# Patient Record
(demographics unavailable — no encounter records)

---

## 2025-01-21 NOTE — REVIEW OF SYSTEMS
[Fatigue] : fatigue [Recent Weight Gain (___ Lbs)] : recent weight gain: [unfilled] lbs [Headaches] : headaches [Decreased Appetite] : appetite not decreased [Visual Field Defect] : no visual field defect [Blurred Vision] : no blurred vision [Dysphagia] : no dysphagia [Neck Pain] : no neck pain [Dysphonia] : no dysphonia [Chest Pain] : no chest pain [Constipation] : no constipation [Diarrhea] : no diarrhea [Polyuria] : no polyuria [Dysuria] : no dysuria [Tremors] : no tremors [Depression] : no depression [Anxiety] : no anxiety [Polydipsia] : no polydipsia [FreeTextEntry3] : no changes

## 2025-01-21 NOTE — PHYSICAL EXAM
[Alert] : alert [Obese] : obese [No Acute Distress] : no acute distress [Normal Sclera/Conjunctiva] : normal sclera/conjunctiva [No Proptosis] : no proptosis [Thyroid Not Enlarged] : the thyroid was not enlarged [No LAD] : no lymphadenopathy [No Thyroid Nodules] : no palpable thyroid nodules [No Respiratory Distress] : no respiratory distress [No Accessory Muscle Use] : no accessory muscle use [Normal Rate and Effort] : normal respiratory rate and effort [Clear to Auscultation] : lungs were clear to auscultation bilaterally [Normal S1, S2] : normal S1 and S2 [Normal Rate] : heart rate was normal [Regular Rhythm] : with a regular rhythm [Normal Bowel Sounds] : normal bowel sounds [Not Tender] : non-tender [Soft] : abdomen soft [No Rash] : no rash [Acanthosis Nigricans] : no acanthosis nigricans [No Tremors] : no tremors [Oriented x3] : oriented to person, place, and time [Normal Affect] : the affect was normal [Normal Insight/Judgement] : insight and judgment were intact [Normal Mood] : the mood was normal [de-identified] : [facility]

## 2025-01-21 NOTE — HISTORY OF PRESENT ILLNESS
[FreeTextEntry1] : Quality: Type 1 DM Severity: moderate Duration: 20 years Onset: at the age of 2, polyuria, polydipsia, seizure Modifying Factors: Better with insulin Associated Symptoms:  here for T1DM on tslim/G6, PCOS (had elevated testosterone, polycystic ovaries), hypothyroidism  interval history on gluten free/dairy free diet 90% just diagnosed with pcos by gyn- has hirsutism and multiple follicles on ovaries. but always with normal menses report Cushing's symptoms and wants to be tested   house in [facility] but has been traveling back to [place] to visit her mom has IUD. not trying to have any more kids  1st pregnancy girl born at 24weeks,  death after 88days in NICU ([place], autopsy suggestive of Zellweger's syndrome) 2nd pregnancy ernesto (at 36.6 weeks), 1.4yo had hemivertebra, no surgery yet 3rd pregnancy parker, born 38 weeks (21)  having site issues. reports bolusing 10-15minutes before meals  Current Regimen: Tslim control IQ with dexcom G6 using Fiasp tirosint 213mcg daily- takes appropriately, feels better with it  settings: basal total 48 12a 2.0 5a 2.5 10a 2.8 2p 3 7:30p 2  ICR: 12a 5  ISF: 12a 10 9pm 35  target: 12a 110 9p 120 AIT: 2hrs   FS in office: 226 - just ate Ketones 0.2 reviewed CGM/pump data avg 212 highs: 65% in target: 35% lows: 0%  Diet: unchanged. 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
No abnormalities noted

## 2025-02-20 NOTE — REASON FOR VISIT
[Home] : at home, [unfilled] , at the time of the visit. [Medical Office: ([facility])___] : at the medical office located in  [Telehealth (audio & video)] : This visit was provided via telehealth using real-time 2-way audio visual technology. [Verbal consent obtained from patient] : the patient, [unfilled] [Follow - Up] : a follow-up visit [DM Type 1] : DM Type 1

## 2025-02-20 NOTE — ASSESSMENT
[FreeTextEntry1] : 27F w/ T1DM on tslim control IQ with G6, gluten sensitivity, PCOS, hypothyroidism  PCOS- recently diagnosed. has IUD. does not want kids at this point. not on spironolactone but was discussed with gyn. warned that cannot get pregnant if taking spironolactone. might do better with metformin. try metformin ER 500mg daily with food (can probably take more if feels better on it). on pcos pills from gyn  Obesity- doing better, doing gluten free/dairy diet. 12/2022 1mg dst normal, repeat was normal   Type 1 DM on pump and sensor, uncontrolled, change sensor to G7- still needs to try to rotate more like the upper buttock bolus before meals when possible decrease carb ratio at 10am-2pm 5 to help with hyperglycemia, then keep carb ratio at 6 pm all other times continue to see eye doctor  Hypothyroidism- better on tirosint. TSH elevated. increase tirosint to 225mcg daily. (does not do well with generic or brand pills).  Follow up in 3 months with NP  Follow up in 6 months with MD.  Overall spent 30 minutes with patient and documenting.

## 2025-02-20 NOTE — PHYSICAL EXAM
[Alert] : alert [No Acute Distress] : no acute distress [de-identified] : Physical exam deferred due to Telehealth visit

## 2025-02-20 NOTE — REVIEW OF SYSTEMS
[Recent Weight Loss (___ Lbs)] : recent weight loss: [unfilled] lbs [Anxiety] : anxiety [Fatigue] : no fatigue [Decreased Appetite] : appetite not decreased [Visual Field Defect] : no visual field defect [Blurred Vision] : no blurred vision [Dysphagia] : no dysphagia [Neck Pain] : no neck pain [Dysphonia] : no dysphonia [Chest Pain] : no chest pain [Palpitations] : no palpitations [Constipation] : no constipation [Diarrhea] : no diarrhea [Polyuria] : no polyuria [Dysuria] : no dysuria [Headaches] : no headaches [Tremors] : no tremors [Depression] : no depression [Polydipsia] : no polydipsia [de-identified] : controlled with medication

## 2025-05-13 NOTE — REVIEW OF SYSTEMS
[Fatigue] : no fatigue [Decreased Appetite] : appetite not decreased [Recent Weight Gain (___ Lbs)] : no recent weight gain [Recent Weight Loss (___ Lbs)] : no recent weight loss [Visual Field Defect] : no visual field defect [Blurred Vision] : no blurred vision [Dysphagia] : no dysphagia [Neck Pain] : no neck pain [Dysphonia] : no dysphonia [Chest Pain] : no chest pain [Palpitations] : no palpitations [Constipation] : no constipation [Diarrhea] : no diarrhea [Polyuria] : no polyuria [Dysuria] : no dysuria [Dry Skin] : no dry skin [Hair Loss] : no hair loss [Headaches] : no headaches [Tremors] : no tremors [Depression] : no depression [Anxiety] : no anxiety [Polydipsia] : no polydipsia [FreeTextEntry2] : weight stable [de-identified] : controlled with medication

## 2025-05-13 NOTE — PHYSICAL EXAM
[Alert] : alert [No Acute Distress] : no acute distress [de-identified] : Physical exam deferred due to Telehealth visit

## 2025-05-13 NOTE — ASSESSMENT
[FreeTextEntry1] : 27F w/ T1DM on tslim control IQ with G6, gluten sensitivity, PCOS, hypothyroidism  PCOS- recently diagnosed. has IUD. does not want kids at this point. not on spironolactone but was discussed with gyn. warned that cannot get pregnant if taking spironolactone. might do better with metformin. try metformin ER 500mg daily with food (can probably take more if feels better on it). on pcos pills from gyn  Obesity- doing gluten free/dairy diet. 12/2022 1mg dst normal, repeat was normal  Type 1 DM on pump and sensor, uncontrolled, change sensor to G7- still needs to try to rotate more like the upper buttock Increase Metformin to 3 tabs a day for insulin resistance  bolus before meals when possible decrease ISF at 9pm to 10 and BG target at 9pm to 110 continue to see eye doctor  Hypothyroidism- better on tirosint. TSH elevated. Continue tirosint 226 mcg daily. (does not do well with generic or brand pills).  Check labs before next visit.  Follow up in 2 months with MD.  Overall spent 30 minutes with patient and documenting.

## 2025-05-13 NOTE — HISTORY OF PRESENT ILLNESS
[FreeTextEntry1] : Quality: Type 1 DM Severity: moderate Duration: 20 years Onset: at the age of 2, polyuria, polydipsia, seizure Modifying Factors: Better with insulin Associated Symptoms: here for T1DM on tslim/G6, PCOS (had elevated testosterone, polycystic ovaries), hypothyroidism  interval history needs updated labs last 2 months blood sugars have been high and patient has been changing pump settings.  doing well on Lexapro on gluten free/dairy free diet 90% just diagnosed with pcos by gyn- has hirsutism and multiple follicles on ovaries. but always with normal menses report Cushing's symptoms and wants to be tested house in [facility] but has been traveling back to [place] to visit her mom has IUD. not trying to have any more kids  1st pregnancy girl born at 24weeks,  death after 88days in NICU ([place], autopsy suggestive of Zellweger's syndrome) 2nd pregnancy ernesto (at 36.6 weeks), 1.6yo had hemivertebra, no surgery yet 3rd pregnancy parker, born 38 weeks (21)  having site issues. reports bolusing 10-15minutes before meals  Current Regimen: Metformin 500 mg BID Tslim control IQ with dexcom G6 using Fiasp tirosint 213mcg daily- takes appropriately, feels better with it settings: basal total 59.2 12a 2.0 5a 2.5 10a 2.8 2p 3 7:30p 2  ICR: 12a 4  ISF: 12a 10 9pm 20  target: 12a 110 9p 120 AIT: 2hrs   Current  on Dexcom reviewed CGM/pump data: Omnipod and Dexcom are not communicating avg 214 highs: 61% in target: 38% lows: 1%  Diet: unchanged. 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

## 2025-05-13 NOTE — REASON FOR VISIT
[Home] : at home, [unfilled] , at the time of the visit. [Medical Office: ([facility])___] : at the medical office located in  [Telehealth (audio & video)] : This visit was provided via telehealth using real-time 2-way audio visual technology. [Verbal consent obtained from patient] : the patient, [unfilled] [Follow - Up] : a follow-up visit [DM Type 1] : DM Type 1

## 2025-07-02 NOTE — REASON FOR VISIT
[Follow - Up] : a follow-up visit [Follow-Up: _____] : a [unfilled] follow-up visit [FreeTextEntry1] : Type 1 DM and Hypothyroidism

## 2025-07-02 NOTE — PHYSICAL EXAM
[Alert] : alert [Well Nourished] : well nourished [Obese] : obese [No Acute Distress] : no acute distress [Well Developed] : well developed [Normal Sclera/Conjunctiva] : normal sclera/conjunctiva [EOMI] : extra ocular movement intact [No Proptosis] : no proptosis [Normal Oropharynx] : the oropharynx was normal [Thyroid Not Enlarged] : the thyroid was not enlarged [No Thyroid Nodules] : no palpable thyroid nodules [No Respiratory Distress] : no respiratory distress [No Accessory Muscle Use] : no accessory muscle use [Clear to Auscultation] : lungs were clear to auscultation bilaterally [Normal S1, S2] : normal S1 and S2 [Normal Rate] : heart rate was normal [Regular Rhythm] : with a regular rhythm [Normal Bowel Sounds] : normal bowel sounds [Not Tender] : non-tender [Not Distended] : not distended [Soft] : abdomen soft [Hirsutism] : hirsutism present [Oriented x3] : oriented to person, place, and time [Normal Affect] : the affect was normal [Normal Mood] : the mood was normal

## 2025-07-02 NOTE — HISTORY OF PRESENT ILLNESS
[FreeTextEntry1] : here for T1DM on tslim/G6, PCOS (had elevated testosterone, polycystic ovaries), hypothyroidism  interval history 1st pregnancy girl born at 24weeks,  death after 88days in NICU ([place], autopsy suggestive of Zellweger's syndrome) 2nd pregnancy ernesto (at 36.6 weeks, ) had hemivertebra --> 5years old 3rd pregnancy parker, born 38 weeks (21) -->3years old  IUD in place, might want a 3rd child  to [place] in 2024 off compounded lexapro (felt a lot of fatigue)  still gluten free/dairy free diet 90% diet has been better  has been exercising technically lives in [facility] but difficult to get insurance  having site issues. reports bolusing 10-15minutes before meals   Current Regimen: Tslim control IQ with dexcom G7 --> only using pods as basals but taking vials/syringes for boluses using humalog metformin ER 500mg 3 tabs daily- having diarrhea inositol 4 tabs daily tirosint 226mcg daily- (200+13+13) takes appropriately, feels better with it  settings: basal total 59.2 12a 2.0 5a 2.5 10a 2.8 2p 3 730p 2  ICR:  12a 4  ISF:  12a 10  target:  12a 110 9p 120 AIT: 2hrs   FS in office: 157 reviewed CGM/pump data --> does not have enough pods, usually syringes to bolus based on pump calculations. still with postprandial highs avg 234 highs: 69% in target: 31% lows: 0%  Diet: unchanged. less fast food exercise: pilates  Date of last eye exam: needs to see

## 2025-07-02 NOTE — ASSESSMENT
[FreeTextEntry1] : 28F w/ T1DM on tslim control IQ with G6, gluten sensitivity, PCOS, hypothyroidism, anxiety depression rtc on 7/25 with cde to make sure pump and settings are fine. also to make sure doing better with lexapro rtc in 3months  labs done in the office today  Anxiety and depression- restart lexapro 5mg bedtime. patient does better with ssri but maybe not the compounded version  PCOS- diagnosed 2024. has IUD but wants another child in the future not on spironolactone but was discussed as it can help with hirsutism/ance. warned that cannot get pregnant if taking spironolactone. having diarrhea, cut back to 2 tabs of metformin ER 500mg daily with food  on inositol 4 tabs daily  Obesity- doing better, doing gluten free/dairy diet. 12/2022 1mg dst normal --> 1/2025 repeat 1mg dst normal  Type 1 DM on pump and sensor. better on Omnipod5 w/ G7-worsened due to supply issues but still needs to try to rotate more like the upper buttock bolus before meals when possible no changes in settings needs to see eye doctor  Hypothyroidism- better on tirosint. need labs. continue tirosint 226mcg daily. (does not do well with generic or brand pills). only 13mcg is covered (25mcg not covered)